# Patient Record
Sex: MALE | Race: WHITE | NOT HISPANIC OR LATINO | URBAN - METROPOLITAN AREA
[De-identification: names, ages, dates, MRNs, and addresses within clinical notes are randomized per-mention and may not be internally consistent; named-entity substitution may affect disease eponyms.]

---
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
## 2025-02-11 NOTE — ED PEDIATRIC TRIAGE NOTE - TEMPERATURE IN FAHRENHEIT (DEGREES F)
The patient has been re-examined and I agree with the above assessment or I updated with my findings. 98